# Patient Record
Sex: MALE | Race: ASIAN | NOT HISPANIC OR LATINO | URBAN - METROPOLITAN AREA
[De-identification: names, ages, dates, MRNs, and addresses within clinical notes are randomized per-mention and may not be internally consistent; named-entity substitution may affect disease eponyms.]

---

## 2017-09-18 ENCOUNTER — EMERGENCY (EMERGENCY)
Facility: HOSPITAL | Age: 67
LOS: 1 days | Discharge: HOME | End: 2017-09-18
Attending: EMERGENCY MEDICINE | Admitting: EMERGENCY MEDICINE
Payer: MEDICARE

## 2017-09-18 VITALS
RESPIRATION RATE: 20 BRPM | OXYGEN SATURATION: 99 % | SYSTOLIC BLOOD PRESSURE: 128 MMHG | DIASTOLIC BLOOD PRESSURE: 87 MMHG | TEMPERATURE: 98 F | HEART RATE: 80 BPM

## 2017-09-18 DIAGNOSIS — Z23 ENCOUNTER FOR IMMUNIZATION: ICD-10-CM

## 2017-09-18 DIAGNOSIS — Z88.6 ALLERGY STATUS TO ANALGESIC AGENT: ICD-10-CM

## 2017-09-18 DIAGNOSIS — S61.011A LACERATION WITHOUT FOREIGN BODY OF RIGHT THUMB WITHOUT DAMAGE TO NAIL, INITIAL ENCOUNTER: ICD-10-CM

## 2017-09-18 PROCEDURE — 99283 EMERGENCY DEPT VISIT LOW MDM: CPT | Mod: 25

## 2017-09-18 RX ORDER — TETANUS TOXOID, REDUCED DIPHTHERIA TOXOID AND ACELLULAR PERTUSSIS VACCINE, ADSORBED 5; 2.5; 8; 8; 2.5 [IU]/.5ML; [IU]/.5ML; UG/.5ML; UG/.5ML; UG/.5ML
0.5 SUSPENSION INTRAMUSCULAR ONCE
Qty: 0 | Refills: 0 | Status: COMPLETED | OUTPATIENT
Start: 2017-09-18 | End: 2017-09-18

## 2017-09-18 RX ADMIN — TETANUS TOXOID, REDUCED DIPHTHERIA TOXOID AND ACELLULAR PERTUSSIS VACCINE, ADSORBED 0.5 MILLILITER(S): 5; 2.5; 8; 8; 2.5 SUSPENSION INTRAMUSCULAR at 00:47

## 2017-09-18 NOTE — ED PROVIDER NOTE - SKIN, MLM
~ .7 cm lac- superficial to tip of R thumb- nail sliced ~ 3mm vertically (broke the cut), n/v intact

## 2017-09-18 NOTE — ED PROVIDER NOTE - OBJECTIVE STATEMENT
67 M here with laceration to R thumb tip from a kitchen knife. ? Td. No other complaints. No other injuries.

## 2017-09-18 NOTE — ED ADULT TRIAGE NOTE - CHIEF COMPLAINT QUOTE
walkin patient with complaints of laceration tor ight thumb by knife while washing dishes. No active bleeding noted. Last tdap unknown.

## 2017-09-18 NOTE — ED PROCEDURE NOTE - CPROC ED POST PROC CARE GUIDE1
Instructed patient/caregiver regarding signs and symptoms of infection./Verbal/written post procedure instructions were given to patient/caregiver./Keep the cast/splint/dressing clean and dry.

## 2018-10-27 ENCOUNTER — EMERGENCY (EMERGENCY)
Facility: HOSPITAL | Age: 68
LOS: 1 days | Discharge: HOME | End: 2018-10-27
Attending: EMERGENCY MEDICINE | Admitting: EMERGENCY MEDICINE
Payer: MEDICARE

## 2018-10-27 VITALS
SYSTOLIC BLOOD PRESSURE: 129 MMHG | TEMPERATURE: 97 F | DIASTOLIC BLOOD PRESSURE: 75 MMHG | RESPIRATION RATE: 20 BRPM | HEART RATE: 93 BPM | OXYGEN SATURATION: 96 %

## 2018-10-27 PROCEDURE — 73110 X-RAY EXAM OF WRIST: CPT | Mod: 26,RT

## 2018-10-27 PROCEDURE — 25630 CLTX CARPL FX W/O MNPJ EA B1: CPT | Mod: 54

## 2018-10-27 PROCEDURE — 99284 EMERGENCY DEPT VISIT MOD MDM: CPT | Mod: 25

## 2018-10-27 NOTE — ED ADULT NURSE NOTE - NSIMPLEMENTINTERV_GEN_ALL_ED
Implemented All Universal Safety Interventions:  [place] to call system. Call bell, personal items and telephone within reach. Instruct patient to call for assistance. Room bathroom lighting operational. Non-slip footwear when patient is off stretcher. Physically safe environment: no spills, clutter or unnecessary equipment. Stretcher in lowest position, wheels locked, appropriate side rails in place.

## 2018-10-27 NOTE — ED PROVIDER NOTE - NSFOLLOWUPINSTRUCTIONS_ED_ALL_ED_FT
Keep hand in splint.  Follow up with Dr. Boggs from hand surgery in 1 week or alternatively follow up with hand surgeon in [place].
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
## 2018-10-27 NOTE — ED PROVIDER NOTE - PRINCIPAL DIAGNOSIS
Wrist injury, right, initial encounter Closed nondisplaced fracture of triquetrum of right wrist, initial encounter

## 2018-10-27 NOTE — ED PROVIDER NOTE - CARE PLAN
Principal Discharge DX:	Wrist injury, right, initial encounter Principal Discharge DX:	Closed nondisplaced fracture of triquetrum of right wrist, initial encounter

## 2018-10-27 NOTE — ED PROVIDER NOTE - CONSTITUTIONAL, MLM
normal... Well appearing, well nourished, awake, alert, oriented to person, place, time/situation and in no apparent distress.  No head trauma.

## 2018-10-27 NOTE — ED PROVIDER NOTE - SKIN, MLM
Skin normal color for race, warm, dry and intact. No evidence of rash.  Skin in tact, no skin breaks.

## 2018-10-27 NOTE — ED PROVIDER NOTE - DIAGNOSTIC INTERPRETATION
3 views R wrist:  + triquetrum fracture, soft tissue swelling in area, no significant displacement and no dislocation.
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
## 2018-10-27 NOTE — ED PROVIDER NOTE - MUSCULOSKELETAL, MLM
Mild distal R ulnar ttp, FROM, no crepitus, nl cap refill and pulses, no deformity, no snuffbox ttp.  No ttp to L wrist.

## 2018-10-28 RX ORDER — OXYCODONE AND ACETAMINOPHEN 5; 325 MG/1; MG/1
1 TABLET ORAL ONCE
Qty: 0 | Refills: 0 | Status: DISCONTINUED | OUTPATIENT
Start: 2018-10-28 | End: 2018-10-28

## 2018-10-31 DIAGNOSIS — S62.114A NONDISPLACED FRACTURE OF TRIQUETRUM [CUNEIFORM] BONE, RIGHT WRIST, INITIAL ENCOUNTER FOR CLOSED FRACTURE: ICD-10-CM
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
## 2018-10-31 DIAGNOSIS — Z88.6 ALLERGY STATUS TO ANALGESIC AGENT: ICD-10-CM

## 2018-10-31 DIAGNOSIS — M25.531 PAIN IN RIGHT WRIST: ICD-10-CM
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
## 2020-07-24 ENCOUNTER — EMERGENCY (EMERGENCY)
Facility: HOSPITAL | Age: 70
LOS: 1 days | Discharge: HOME | End: 2020-07-24
Admitting: EMERGENCY MEDICINE
Payer: MEDICARE

## 2020-07-24 VITALS
TEMPERATURE: 99 F | WEIGHT: 134.92 LBS | HEART RATE: 94 BPM | HEIGHT: 70 IN | SYSTOLIC BLOOD PRESSURE: 115 MMHG | OXYGEN SATURATION: 97 % | DIASTOLIC BLOOD PRESSURE: 80 MMHG | RESPIRATION RATE: 16 BRPM

## 2020-07-24 DIAGNOSIS — H00.15 CHALAZION LEFT LOWER EYELID: ICD-10-CM

## 2020-07-24 DIAGNOSIS — H57.12 OCULAR PAIN, LEFT EYE: ICD-10-CM

## 2020-07-24 PROCEDURE — 99283 EMERGENCY DEPT VISIT LOW MDM: CPT

## 2020-07-24 RX ORDER — ERYTHROMYCIN BASE 5 MG/GRAM
1 OINTMENT (GRAM) OPHTHALMIC (EYE)
Qty: 1 | Refills: 0
Start: 2020-07-24 | End: 2020-08-02

## 2020-07-24 NOTE — ED ADULT NURSE NOTE - CHPI ED NUR SYMPTOMS NEG
no blurred vision/no foreign body/no pain/no photophobia/no purulent drainage/no drainage/no eye lid swelling/no itching/no double vision/no discharge

## 2020-07-24 NOTE — ED PROVIDER NOTE - OBJECTIVE STATEMENT
Pt. denies any PMH , c/o growth to lower lt eye lid X 3 days, no pain , no vision changes, no eye d/c, no fever/chills.

## 2020-07-24 NOTE — ED PROVIDER NOTE - EYES, MLM
lt lower lid + pustule, no ttp, no d/c, eye lases nl,  sclera clear, Clear bilaterally, pupils equal, round and reactive to light.

## 2020-07-24 NOTE — ED PROVIDER NOTE - NSFOLLOWUPINSTRUCTIONS_ED_ALL_ED_FT
Chalazion     A chalazion is a swelling or lump on the eyelid. It can affect the upper eyelid or the lower eyelid.  What are the causes?  This condition may be caused by:  Long-lasting (chronic) inflammation of the eyelid glands.A blocked oil gland in the eyelid.What are the signs or symptoms?  Symptoms of this condition include:  Swelling of the eyelid. The swelling may spread to areas around the eye.A hard lump on the eyelid.Blurry vision. The lump on the eyelid may make it hard to see out of the eye.How is this diagnosed?  This condition is diagnosed with an examination of the eye.  How is this treated?  This condition is treated by applying a warm compress to the eyelid. If the condition does not improve, it may be treated with:  Medicine that is injected into the chalazion by a health care provider.Surgery.Medicine that is applied to the eye.Follow these instructions at home:  Managing pain and swelling     Apply a warm, moist compress to the eyelid 4–6 times a day for 10–15 minutes at a time. This will help to open any blocked glands and to reduce redness and swelling.Apply over-the-counter and prescription medicines only as told by your health care provider.General instructions     Do not touch the chalazion.Do not try to remove the pus. Do not squeeze the chalazion or stick it with a pin or needle.Do not rub your eyes.Wash your hands often. Dry your hands with a clean towel.Keep your face, scalp, and eyebrows clean.Avoid wearing eye makeup.If the chalazion does not break open (rupture) on its own, return to your health care provider.Keep all follow-up appointments as told by your health care provider. This is important.Contact a health care provider if:  Your eyelid has not improved in 4 weeks.Your eyelid is getting worse.You have a fever.The chalazion does not rupture on its own after a month of home treatment.Get help right away if:  You have pain in your eye.Your vision changes.The chalazion becomes painful or red.The chalazion gets bigger.Summary  A chalazion is a swelling or lump on the upper or lower eyelid.It may be caused by chronic inflammation or a blocked oil gland.Apply a warm, moist compress to the eyelid 4–6 times a day for 10–15 minutes at a time.Keep your face, scalp, and eyebrows clean.This information is not intended to replace advice given to you by your health care provider. Make sure you discuss any questions you have with your health care provider.

## 2020-07-24 NOTE — ED PROVIDER NOTE - CLINICAL SUMMARY MEDICAL DECISION MAKING FREE TEXT BOX
Pt. denies any PMH , c/o growth to lower lt eye lid X 3 days, no pain , no vision changes, no eye d/c, no fever/chills vss, exam consistent with chalazion.
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
## 2020-07-24 NOTE — ED PROVIDER NOTE - CHPI ED SYMPTOMS NEG
no double vision/no discharge/no drainage/no foreign body/no blurred vision/no purulent drainage/no photophobia/no pain/no itching

## 2020-07-24 NOTE — ED PROVIDER NOTE - PATIENT PORTAL LINK FT
You can access the FollowMyHealth Patient Portal offered by [facility] by registering at the following website: [email or web address]. By joining [employer]’s FollowMyHealth portal, you will also be able to view your health information using other applications (apps) compatible with our system.

## 2020-08-03 ENCOUNTER — EMERGENCY (EMERGENCY)
Facility: HOSPITAL | Age: 70
LOS: 1 days | Discharge: HOME | End: 2020-08-03
Admitting: EMERGENCY MEDICINE
Payer: MEDICARE

## 2020-08-03 VITALS
WEIGHT: 139.99 LBS | HEART RATE: 86 BPM | HEIGHT: 70 IN | SYSTOLIC BLOOD PRESSURE: 132 MMHG | OXYGEN SATURATION: 96 % | TEMPERATURE: 99 F | DIASTOLIC BLOOD PRESSURE: 83 MMHG | RESPIRATION RATE: 18 BRPM

## 2020-08-03 VITALS
DIASTOLIC BLOOD PRESSURE: 86 MMHG | SYSTOLIC BLOOD PRESSURE: 134 MMHG | TEMPERATURE: 99 F | HEART RATE: 81 BPM | OXYGEN SATURATION: 97 % | RESPIRATION RATE: 16 BRPM

## 2020-08-03 DIAGNOSIS — R05 COUGH: ICD-10-CM

## 2020-08-03 DIAGNOSIS — Z88.8 ALLERGY STATUS TO OTHER DRUGS, MEDICAMENTS AND BIOLOGICAL SUBSTANCES STATUS: ICD-10-CM

## 2020-08-03 DIAGNOSIS — Z20.828 CONTACT WITH AND (SUSPECTED) EXPOSURE TO OTHER VIRAL COMMUNICABLE DISEASES: ICD-10-CM

## 2020-08-03 DIAGNOSIS — K21.9 GASTRO-ESOPHAGEAL REFLUX DISEASE WITHOUT ESOPHAGITIS: ICD-10-CM

## 2020-08-03 LAB
ALBUMIN SERPL ELPH-MCNC: 3.6 G/DL — SIGNIFICANT CHANGE UP (ref 3.4–5)
ALP SERPL-CCNC: 63 U/L — SIGNIFICANT CHANGE UP (ref 40–120)
ALT FLD-CCNC: 17 U/L — SIGNIFICANT CHANGE UP (ref 12–42)
ANION GAP SERPL CALC-SCNC: 7 MMOL/L — LOW (ref 9–16)
AST SERPL-CCNC: 17 U/L — SIGNIFICANT CHANGE UP (ref 15–37)
BASOPHILS # BLD AUTO: 0.05 K/UL — SIGNIFICANT CHANGE UP (ref 0–0.2)
BASOPHILS NFR BLD AUTO: 0.7 % — SIGNIFICANT CHANGE UP (ref 0–2)
BILIRUB SERPL-MCNC: 0.9 MG/DL — SIGNIFICANT CHANGE UP (ref 0.2–1.2)
BUN SERPL-MCNC: 11 MG/DL — SIGNIFICANT CHANGE UP (ref 7–23)
CALCIUM SERPL-MCNC: 8.6 MG/DL — SIGNIFICANT CHANGE UP (ref 8.5–10.5)
CHLORIDE SERPL-SCNC: 104 MMOL/L — SIGNIFICANT CHANGE UP (ref 96–108)
CO2 SERPL-SCNC: 30 MMOL/L — SIGNIFICANT CHANGE UP (ref 22–31)
CREAT SERPL-MCNC: 0.8 MG/DL — SIGNIFICANT CHANGE UP (ref 0.5–1.3)
EOSINOPHIL # BLD AUTO: 0.33 K/UL — SIGNIFICANT CHANGE UP (ref 0–0.5)
EOSINOPHIL NFR BLD AUTO: 4.3 % — SIGNIFICANT CHANGE UP (ref 0–6)
GLUCOSE SERPL-MCNC: 87 MG/DL — SIGNIFICANT CHANGE UP (ref 70–99)
HCT VFR BLD CALC: 36.9 % — LOW (ref 39–50)
HGB BLD-MCNC: 12.9 G/DL — LOW (ref 13–17)
IMM GRANULOCYTES NFR BLD AUTO: 0.3 % — SIGNIFICANT CHANGE UP (ref 0–1.5)
LYMPHOCYTES # BLD AUTO: 1.29 K/UL — SIGNIFICANT CHANGE UP (ref 1–3.3)
LYMPHOCYTES # BLD AUTO: 17 % — SIGNIFICANT CHANGE UP (ref 13–44)
MCHC RBC-ENTMCNC: 31.9 PG — SIGNIFICANT CHANGE UP (ref 27–34)
MCHC RBC-ENTMCNC: 35 GM/DL — SIGNIFICANT CHANGE UP (ref 32–36)
MCV RBC AUTO: 91.3 FL — SIGNIFICANT CHANGE UP (ref 80–100)
MONOCYTES # BLD AUTO: 0.54 K/UL — SIGNIFICANT CHANGE UP (ref 0–0.9)
MONOCYTES NFR BLD AUTO: 7.1 % — SIGNIFICANT CHANGE UP (ref 2–14)
NEUTROPHILS # BLD AUTO: 5.36 K/UL — SIGNIFICANT CHANGE UP (ref 1.8–7.4)
NEUTROPHILS NFR BLD AUTO: 70.6 % — SIGNIFICANT CHANGE UP (ref 43–77)
NRBC # BLD: 0 /100 WBCS — SIGNIFICANT CHANGE UP (ref 0–0)
NT-PROBNP SERPL-SCNC: 108 PG/ML — SIGNIFICANT CHANGE UP
PLATELET # BLD AUTO: 195 K/UL — SIGNIFICANT CHANGE UP (ref 150–400)
POTASSIUM SERPL-MCNC: 4.2 MMOL/L — SIGNIFICANT CHANGE UP (ref 3.5–5.3)
POTASSIUM SERPL-SCNC: 4.2 MMOL/L — SIGNIFICANT CHANGE UP (ref 3.5–5.3)
PROT SERPL-MCNC: 6.7 G/DL — SIGNIFICANT CHANGE UP (ref 6.4–8.2)
RBC # BLD: 4.04 M/UL — LOW (ref 4.2–5.8)
RBC # FLD: 12.2 % — SIGNIFICANT CHANGE UP (ref 10.3–14.5)
SODIUM SERPL-SCNC: 141 MMOL/L — SIGNIFICANT CHANGE UP (ref 132–145)
TROPONIN I SERPL-MCNC: <0.017 NG/ML — LOW (ref 0.02–0.06)
WBC # BLD: 7.59 K/UL — SIGNIFICANT CHANGE UP (ref 3.8–10.5)
WBC # FLD AUTO: 7.59 K/UL — SIGNIFICANT CHANGE UP (ref 3.8–10.5)

## 2020-08-03 PROCEDURE — 99284 EMERGENCY DEPT VISIT MOD MDM: CPT

## 2020-08-03 PROCEDURE — 93010 ELECTROCARDIOGRAM REPORT: CPT

## 2020-08-03 RX ORDER — FAMOTIDINE 10 MG/ML
1 INJECTION INTRAVENOUS
Qty: 14 | Refills: 0
Start: 2020-08-03 | End: 2020-08-16

## 2020-08-03 NOTE — ED PROVIDER NOTE - OBJECTIVE STATEMENT
68 y/o male with PMHx of GERD (usually treated with vinegar and mixed with water) presents to the ED with complaints of some throat soreness and itching x 2 days. Also notes that last night, Patient had a mild episode of GERD and felt slightly nauseous. He attempted to treat the GERD with vinegar but received only minimal relief. Cough is also associated with the sore throat and keeps him up at night. Cough is nonproductive. Denies fever, chills, vomiting, chest pain, SOB, abdominal pain, dizziness, numbness, weakness, tingling, and headache. Patient notes that his wife was recently hospitalized for SBO and tested negative for COVID. Patient has not been tested for COVID.

## 2020-08-03 NOTE — ED PROVIDER NOTE - NSFOLLOWUPINSTRUCTIONS_ED_ALL_ED_FT
Gastritis    Gastritis is soreness and swelling (inflammation) of the lining of the stomach. Gastritis can develop as a sudden onset (acute) or long-term (chronic) condition. If gastritis is not treated, it can lead to stomach bleeding and ulcers. Causes include viral and bacterial infections, excessive alcohol consumption, tobacco use, or certain medications. Symptoms include nausea, vomiting, or abdominal pain or burning especially after eating. Avoid foods or drinks that make your symptoms worse such as caffeine, chocolate, spicy foods, acidic foods, or alcohol.    SEEK IMMEDIATE MEDICAL CARE IF YOU HAVE ANY OF THE FOLLOWING SYMPTOMS: black or bloody stools, blood or coffee-ground-colored vomitus, worsening abdominal pain, fever, or inability to keep fluids down.     THE COVID 19 TEST   - results may take up to 1-2 days to become available   - if your result is positive, you will receive a phone call from one of our coronavirus specialists   - negative result may not be communicated on time due to the sheer volume of testing from the ER. If you haven't heard from us within 5 days, you can check [employer] [facility] or call [address] [place] [address] (please do not call the ER for results)    Please continue to self quarantine (home isolation) until your result is back and following instructions accordingly  - positive: complete home isolation for a total of 14 days since day of testing and no more fever with feeling back to baseline   - negative: you will be able to stop home isolation but still practice standard precautions, similar to how you would manage a regular flu/cold     Return to ER for any worsening symptoms, such as persistent fever >100.4F, shortness of breath, coughing up bloody sputum, chest pain, lethargy, and fainting    Please remember to wash your hands frequently, avoid touching your face, and cover your cough/sneeze

## 2020-08-03 NOTE — ED PROVIDER NOTE - CHPI ED SYMPTOMS NEG
no weakness/no chills/no vomiting/no fever/no numbness/no chest pain, no SOB, no tingling, no abdominal pain, no dizziness, no headache

## 2020-08-03 NOTE — ED ADULT TRIAGE NOTE - CHIEF COMPLAINT QUOTE
Pt presents to ED with c/o indigestion and sore throat since last night, he attributes this to his hx of GERD, he has tried a homemade vinegar for the symptoms, denies H2 blocker or PPI use, afebrile, no known COVID-19 exposure. Pt presents to ED with c/o indigestion and sore throat since last night, he attributes this to his hx of GERD, he has tried a homemade vinegar solution for the symptoms, denies H2 blocker or PPI use, afebrile, no known COVID-19 exposure.

## 2020-08-03 NOTE — ED PROVIDER NOTE - PATIENT PORTAL LINK FT
You can access the FollowMyHealth Patient Portal offered by [facility] by registering at the following website: [email or web address]. By joining [employer] FollowMyHealth portal, you will also be able to view your health information using other applications (apps) compatible with our system.

## 2020-08-03 NOTE — ED ADULT NURSE NOTE - CHIEF COMPLAINT QUOTE
Pt presents to ED with c/o indigestion and sore throat since last night, he attributes this to his hx of GERD, he has tried a homemade vinegar for the symptoms, denies H2 blocker or PPI use, afebrile, no known COVID-19 exposure.

## 2020-08-03 NOTE — ED PROVIDER NOTE - PR
Please ask patient if she is feeling hopeless or if she has thoughts about harming herself. If so- RTO tomorrow, or go to ER if actively suicidal.    Otherwise, I will send in an Rx for 1 month. She can start now, but I do want to see her in the next 1-2 weeks.   She can start w 1 whole tablet q am, but if she has nausea with it, she can take 1/2 tablet for a week, then increase to 1 whole tablet.  Take w food.  Common side effects- dry mouth (always resolves), GI sx's (almost always resolve).  RTO sooner prn.   172

## 2020-08-03 NOTE — ED PROVIDER NOTE - CLINICAL SUMMARY MEDICAL DECISION MAKING FREE TEXT BOX
70 y/o male with history of GERD presenting with throat soreness and itching x 2 days. Will do labs, obtain COVID swab, and EKG.

## 2020-08-03 NOTE — ED ADULT NURSE NOTE - CHPI ED NUR SYMPTOMS NEG
no hematuria/no dysuria/no diarrhea/no fever/no chills/no nausea/no abdominal distension/no blood in stool/no burning urination/no vomiting

## 2020-08-03 NOTE — ED ADULT NURSE NOTE - OBJECTIVE STATEMENT
Pt w/ PMH of GERD presents w/o 4/10 mid-epigastric pain worsening since last night.  Pt denies SOB, cough, fever/chills, dizziness, N/V/D.  Pt states he normally treats w/ a home remedy, but denies relief w/ current regimen.  Pt is pending eval by [facility].

## 2020-08-03 NOTE — ED PROVIDER NOTE - PROGRESS NOTE DETAILS
99 rechecked pt - discussed all results. will d/c with pepcid. pt will follow up with his GI doctor for further evaluation. understands and agrees with plan.

## 2020-08-04 LAB — SARS-COV-2 RNA SPEC QL NAA+PROBE: SIGNIFICANT CHANGE UP

## 2020-08-05 PROBLEM — K21.9 GASTRO-ESOPHAGEAL REFLUX DISEASE WITHOUT ESOPHAGITIS: Chronic | Status: ACTIVE | Noted: 2020-08-03

## 2020-08-05 PROBLEM — Z00.00 ENCOUNTER FOR PREVENTIVE HEALTH EXAMINATION: Status: ACTIVE | Noted: 2020-08-05

## 2020-08-07 ENCOUNTER — EMERGENCY (EMERGENCY)
Facility: HOSPITAL | Age: 70
LOS: 1 days | Discharge: HOME | End: 2020-08-07
Attending: EMERGENCY MEDICINE | Admitting: EMERGENCY MEDICINE
Payer: MEDICARE

## 2020-08-07 VITALS
TEMPERATURE: 99 F | HEART RATE: 96 BPM | RESPIRATION RATE: 18 BRPM | OXYGEN SATURATION: 97 % | DIASTOLIC BLOOD PRESSURE: 73 MMHG | SYSTOLIC BLOOD PRESSURE: 115 MMHG | HEIGHT: 70 IN | WEIGHT: 134.04 LBS

## 2020-08-07 VITALS
RESPIRATION RATE: 18 BRPM | TEMPERATURE: 98 F | DIASTOLIC BLOOD PRESSURE: 81 MMHG | SYSTOLIC BLOOD PRESSURE: 133 MMHG | HEART RATE: 87 BPM | OXYGEN SATURATION: 99 %

## 2020-08-07 LAB
ALBUMIN SERPL ELPH-MCNC: 4.4 G/DL — SIGNIFICANT CHANGE UP (ref 3.3–5)
ALP SERPL-CCNC: 74 U/L — SIGNIFICANT CHANGE UP (ref 40–120)
ALT FLD-CCNC: 11 U/L — SIGNIFICANT CHANGE UP (ref 10–45)
ANION GAP SERPL CALC-SCNC: 8 MMOL/L — SIGNIFICANT CHANGE UP (ref 5–17)
APTT BLD: 34.8 SEC — SIGNIFICANT CHANGE UP (ref 27.5–35.5)
AST SERPL-CCNC: 24 U/L — SIGNIFICANT CHANGE UP (ref 10–40)
BASOPHILS # BLD AUTO: 0.05 K/UL — SIGNIFICANT CHANGE UP (ref 0–0.2)
BASOPHILS NFR BLD AUTO: 0.9 % — SIGNIFICANT CHANGE UP (ref 0–2)
BILIRUB SERPL-MCNC: 1 MG/DL — SIGNIFICANT CHANGE UP (ref 0.2–1.2)
BUN SERPL-MCNC: 7 MG/DL — SIGNIFICANT CHANGE UP (ref 7–23)
CALCIUM SERPL-MCNC: 9.6 MG/DL — SIGNIFICANT CHANGE UP (ref 8.4–10.5)
CHLORIDE SERPL-SCNC: 101 MMOL/L — SIGNIFICANT CHANGE UP (ref 96–108)
CK MB CFR SERPL CALC: 1.8 NG/ML — SIGNIFICANT CHANGE UP (ref 0–6.7)
CK SERPL-CCNC: 187 U/L — SIGNIFICANT CHANGE UP (ref 30–200)
CO2 SERPL-SCNC: 32 MMOL/L — HIGH (ref 22–31)
CREAT SERPL-MCNC: 0.76 MG/DL — SIGNIFICANT CHANGE UP (ref 0.5–1.3)
D DIMER BLD IA.RAPID-MCNC: <150 NG/ML DDU — SIGNIFICANT CHANGE UP
EOSINOPHIL # BLD AUTO: 0.16 K/UL — SIGNIFICANT CHANGE UP (ref 0–0.5)
EOSINOPHIL NFR BLD AUTO: 2.9 % — SIGNIFICANT CHANGE UP (ref 0–6)
GLUCOSE SERPL-MCNC: 106 MG/DL — HIGH (ref 70–99)
HCT VFR BLD CALC: 44.1 % — SIGNIFICANT CHANGE UP (ref 39–50)
HGB BLD-MCNC: 14.6 G/DL — SIGNIFICANT CHANGE UP (ref 13–17)
IMM GRANULOCYTES NFR BLD AUTO: 0.2 % — SIGNIFICANT CHANGE UP (ref 0–1.5)
INR BLD: 0.96 — SIGNIFICANT CHANGE UP (ref 0.88–1.16)
LYMPHOCYTES # BLD AUTO: 1.31 K/UL — SIGNIFICANT CHANGE UP (ref 1–3.3)
LYMPHOCYTES # BLD AUTO: 23.5 % — SIGNIFICANT CHANGE UP (ref 13–44)
MCHC RBC-ENTMCNC: 31.4 PG — SIGNIFICANT CHANGE UP (ref 27–34)
MCHC RBC-ENTMCNC: 33.1 GM/DL — SIGNIFICANT CHANGE UP (ref 32–36)
MCV RBC AUTO: 94.8 FL — SIGNIFICANT CHANGE UP (ref 80–100)
MONOCYTES # BLD AUTO: 0.45 K/UL — SIGNIFICANT CHANGE UP (ref 0–0.9)
MONOCYTES NFR BLD AUTO: 8.1 % — SIGNIFICANT CHANGE UP (ref 2–14)
NEUTROPHILS # BLD AUTO: 3.59 K/UL — SIGNIFICANT CHANGE UP (ref 1.8–7.4)
NEUTROPHILS NFR BLD AUTO: 64.4 % — SIGNIFICANT CHANGE UP (ref 43–77)
NRBC # BLD: 0 /100 WBCS — SIGNIFICANT CHANGE UP (ref 0–0)
NT-PROBNP SERPL-SCNC: 107 PG/ML — SIGNIFICANT CHANGE UP (ref 0–300)
PLATELET # BLD AUTO: 220 K/UL — SIGNIFICANT CHANGE UP (ref 150–400)
POTASSIUM SERPL-MCNC: 4.6 MMOL/L — SIGNIFICANT CHANGE UP (ref 3.5–5.3)
POTASSIUM SERPL-SCNC: 4.6 MMOL/L — SIGNIFICANT CHANGE UP (ref 3.5–5.3)
PROT SERPL-MCNC: 7.7 G/DL — SIGNIFICANT CHANGE UP (ref 6–8.3)
PROTHROM AB SERPL-ACNC: 11.5 SEC — SIGNIFICANT CHANGE UP (ref 10.6–13.6)
RBC # BLD: 4.65 M/UL — SIGNIFICANT CHANGE UP (ref 4.2–5.8)
RBC # FLD: 11.9 % — SIGNIFICANT CHANGE UP (ref 10.3–14.5)
SODIUM SERPL-SCNC: 141 MMOL/L — SIGNIFICANT CHANGE UP (ref 135–145)
TROPONIN T SERPL-MCNC: <0.01 NG/ML — SIGNIFICANT CHANGE UP (ref 0–0.01)
WBC # BLD: 5.57 K/UL — SIGNIFICANT CHANGE UP (ref 3.8–10.5)
WBC # FLD AUTO: 5.57 K/UL — SIGNIFICANT CHANGE UP (ref 3.8–10.5)

## 2020-08-07 PROCEDURE — 85025 COMPLETE CBC W/AUTO DIFF WBC: CPT

## 2020-08-07 PROCEDURE — 82550 ASSAY OF CK (CPK): CPT

## 2020-08-07 PROCEDURE — 71046 X-RAY EXAM CHEST 2 VIEWS: CPT | Mod: 26

## 2020-08-07 PROCEDURE — 85610 PROTHROMBIN TIME: CPT

## 2020-08-07 PROCEDURE — 84484 ASSAY OF TROPONIN QUANT: CPT

## 2020-08-07 PROCEDURE — 85379 FIBRIN DEGRADATION QUANT: CPT

## 2020-08-07 PROCEDURE — 85730 THROMBOPLASTIN TIME PARTIAL: CPT

## 2020-08-07 PROCEDURE — 36415 COLL VENOUS BLD VENIPUNCTURE: CPT

## 2020-08-07 PROCEDURE — 83880 ASSAY OF NATRIURETIC PEPTIDE: CPT

## 2020-08-07 PROCEDURE — 93005 ELECTROCARDIOGRAM TRACING: CPT

## 2020-08-07 PROCEDURE — 82553 CREATINE MB FRACTION: CPT

## 2020-08-07 PROCEDURE — 99284 EMERGENCY DEPT VISIT MOD MDM: CPT | Mod: 25

## 2020-08-07 PROCEDURE — 71046 X-RAY EXAM CHEST 2 VIEWS: CPT

## 2020-08-07 PROCEDURE — 96374 THER/PROPH/DIAG INJ IV PUSH: CPT

## 2020-08-07 PROCEDURE — 99285 EMERGENCY DEPT VISIT HI MDM: CPT | Mod: 25

## 2020-08-07 PROCEDURE — 93010 ELECTROCARDIOGRAM REPORT: CPT

## 2020-08-07 PROCEDURE — 80053 COMPREHEN METABOLIC PANEL: CPT

## 2020-08-07 RX ORDER — PANTOPRAZOLE SODIUM 20 MG/1
40 TABLET, DELAYED RELEASE ORAL ONCE
Refills: 0 | Status: COMPLETED | OUTPATIENT
Start: 2020-08-07 | End: 2020-08-07

## 2020-08-07 RX ADMIN — PANTOPRAZOLE SODIUM 40 MILLIGRAM(S): 20 TABLET, DELAYED RELEASE ORAL at 18:27

## 2020-08-07 NOTE — ED ADULT NURSE NOTE - CHIEF COMPLAINT QUOTE
" I was seen at [facility] for cough and they told me I have acid reflux but it still bothers me and I was wondering if something can be done about it".

## 2020-08-07 NOTE — ED ADULT NURSE NOTE - OBJECTIVE STATEMENT
Patient AOX4 c/o ongoing cough x 4 days---reports "I was told I have GERD, and that I needed to follow up"---speaking in full, complete sentences. Denies SOB/dizziness.

## 2020-08-07 NOTE — ED PROVIDER NOTE - CPE EDP EYES NORM
Chief Complaint   Patient presents with    6 Month Follow-Up   originally  patient moved from [facility]  ? ? ? Hx of Mitral valve repair/ clip for MR 5 yrs back- transcathater- per pat ( not confirmed)  ? ? ? Hx of coronary stent 2 5 yrs back per pat ( Not confirmed from record)  Record from outside  Reviewed and the above claim of the pat could be verified     6 month F/u    Denied cp, sob, palpitations, dizziness or edema    Nonsmoker    FHX  Mother had valve procedure, had stent at 72    Patient Active Problem List   Diagnosis    Coronary artery disease involving native coronary artery of native heart without angina pectoris    Diastolic murmur    Family history of early CAD    LVH (left ventricular hypertrophy)    ASHD (arteriosclerotic heart disease)    Dyslipidemia    Former smoker    Heart failure with preserved ejection fraction ([address]    Hypertension, essential    Mitral regurgitation    Nicotine dependence, chewing tobacco, uncomplicated    Obesity (BMI 30-39. 9)    S/P coronary artery stent placement    S/P mitral valve repair    Dysfunction of both eustachian tubes    Right lateral epicondylitis       Past Surgical History:   Procedure Laterality Date    APPENDECTOMY      CARDIAC CATHETERIZATION      CORONARY ANGIOPLASTY      INGUINAL HERNIA REPAIR      INGUINAL HERNIA REPAIR Left 05/13/2016    MITRAL VALVE REPAIR      ROTATOR CUFF REPAIR Right     right and left    VENTRAL HERNIA REPAIR  05/13/2016       No Known Allergies     Family History   Problem Relation Age of Onset    No Known Problems Mother     No Known Problems Father     Colon Cancer Neg Hx     Prostate Cancer Neg Hx         Social History     Socioeconomic History    Marital status:      Spouse name: Not on file    Number of children: Not on file    Years of education: Not on file    Highest education level: Not on file   Occupational History    Not on file   Social Needs    Financial resource strain: Not on file    Food insecurity     Worry: Not on file     Inability: Not on file    Transportation needs     Medical: Not on file     Non-medical: Not on file   Tobacco Use    Smoking status: Former Smoker     Start date: 2015     Last attempt to quit: 2016     Years since quittin.0    Smokeless tobacco: Current User     Types: Chew   Substance and Sexual Activity    Alcohol use: Not Currently    Drug use: Never    Sexual activity: Not on file   Lifestyle    Physical activity     Days per week: Not on file     Minutes per session: Not on file    Stress: Not on file   Relationships    Social connections     Talks on phone: Not on file     Gets together: Not on file     Attends Sikhism service: Not on file     Active member of club or organization: Not on file     Attends meetings of clubs or organizations: Not on file     Relationship status: Not on file    Intimate partner violence     Fear of current or ex partner: Not on file     Emotionally abused: Not on file     Physically abused: Not on file     Forced sexual activity: Not on file   Other Topics Concern    Not on file   Social History Narrative    Not on file       Current Outpatient Medications   Medication Sig Dispense Refill    lisinopril (PRINIVIL;ZESTRIL) 20 MG tablet Take 1 tablet by mouth daily 90 tablet 3    nebivolol (BYSTOLIC) 2.5 MG tablet Take 1 tablet by mouth daily 90 tablet 3    atorvastatin (LIPITOR) 20 MG tablet Take 1 tablet by mouth daily 90 tablet 3    amLODIPine (NORVASC) 10 MG tablet Take 1 tablet by mouth daily      varenicline (CHANTIX CONTINUING MONTH ) 1 MG tablet Take 1 tablet by mouth 2 times daily 60 tablet 1    varenicline (CHANTIX STARTING MONTH MANISHA) 0.5 MG X 11 & 1 MG X 42 tablet Take by mouth as directed on box 1 box 0    fluticasone (FLONASE) 50 MCG/ACT nasal spray 1 spray by Nasal route daily 2 Bottle 0    aspirin EC 81 MG EC tablet Take 1 tablet by mouth daily 90 tablet 1     No current facility-administered medications for this visit. Review of Systems -     General ROS: negative  Psychological ROS: negative  Hematological and Lymphatic ROS: No history of blood clots or bleeding disorder. Respiratory ROS: no cough,  or wheezing, the rest see HPI  Cardiovascular ROS: See HPI  Gastrointestinal ROS: negative  Genito-Urinary ROS: no dysuria, trouble voiding, or hematuria  Musculoskeletal ROS: negative  Neurological ROS: no TIA or stroke symptoms  Dermatological ROS: negative      Blood pressure 136/89, pulse 55, height 5' 9\" (1.753 m), weight 211 lb 9.6 oz (96 kg). Physical Examination:    General appearance - alert, well appearing, and in no distress  HEENT- Pink conjunctiva  , Non-icteri sclera,PERRLA  Mental status - alert, oriented to person, place, and time  Neck - supple, no significant adenopathy, no JVD, or carotid bruits  Chest - clear to auscultation, no wheezes, rales or rhonchi, symmetric air entry  Heart - normal rate, regular rhythm, normal S1, S2, no murmurs, rubs, clicks or gallops  Abdomen - soft, nontender, nondistended, no masses or organomegaly  DAVI- no CVA or flank tenderness, no suprapubic tenderness  Neurological - alert, oriented, normal speech, no focal findings or movement disorder noted  Musculoskeletal/limbs - no joint tenderness, deformity or swelling   - peripheral pulses normal, no pedal edema, no clubbing or cyanosis  Skin - normal coloration and turgor, no rashes, no suspicious skin lesions noted  Psych- appropriate mood and affect    Lab  No results for input(s): CKTOTAL, CKMB, CKMBINDEX, TROPONINI in the last 72 hours.   CBC:   Lab Results   Component Value Date    WBC 6.7 01/15/2020    RBC 5.07 01/15/2020    HGB 16.0 01/15/2020    HCT 47.8 01/15/2020    MCV 94.3 01/15/2020    MCH 31.6 01/15/2020    MCHC 33.5 01/15/2020     01/15/2020    MPV 9.3 01/15/2020     BMP:    Lab Results   Component Value Date     01/15/2020    K 4.6 01/15/2020 CL 99 01/15/2020    CO2 24 01/15/2020    BUN 21 01/15/2020    LABALBU 4.5 01/15/2020    CREATININE 0.9 01/15/2020    CALCIUM 9.3 01/15/2020    LABGLOM 88 01/15/2020    GLUCOSE 102 01/15/2020     Hepatic Function Panel:    Lab Results   Component Value Date    ALKPHOS 63 01/15/2020    ALT 27 01/15/2020    AST 26 01/15/2020    PROT 7.2 01/15/2020    BILITOT 0.7 01/15/2020    BILIDIR <0.2 01/15/2020    LABALBU 4.5 01/15/2020     Magnesium:    Lab Results   Component Value Date    MG 2.2 01/15/2020     Warfarin PT/INR:  No components found for: PTPATWAR, PTINRWAR  HgBA1c:  No results found for: LABA1C  FLP:    Lab Results   Component Value Date    TRIG 60 01/15/2020    HDL 57 01/15/2020    LDLCALC 79 01/15/2020     TSH:  No results found for: TSH  Reviewed the record from outside Hospital  Cardiac cath Dec 30, 2015 at [facility], [address] [place] [address] [address] < [place]  ]EF 65%  LAD : 30-50%  LCx/RCA: Normal  RCA could not be accessed radially- had to be accessed through femoral access    Electronically signed by Katerina Thompson MD at 12/30/2015 11:37 AM EST        Mid LAD lesion 50% stenosed.       Final Impression:        1.  Coronary artery disease as described above    2.  Mild to moderate stenosis of the mid left anterior descending artery.    3.  The left circumflex Artery and the right coronary artery appear     angiographically normal.  The right coronary artery has a posterior     takeoff.    4.  Left ventricular filling pressures are normal    5.  Left ventricular systolic function is normal           Plan:        1.  Adding calcium channel blockers for possible spasm.    2.  Aspirin and statin therapy is imperative.      Esmer Chapa MD, [facility]    Echo Dec 2015  CONCLUSIONS     Left ventricular wall thickness mildly increased. Left ventricular cavity size normal.  Mild global hypokinesis.  LVEF   45-50%.     Normal right ventricular size.   Mildly reduced right ventricular global systolic function.     Mild-to-moderate mitral regurgitation    ekg  8/29/19  Sinus  Bradycardia   WITHIN NORMAL LIMITS    Conclusions      Summary   Normal left ventricle size and systolic function. Ejection fraction was   estimated at 60 %. There were no regional left ventricular wall motion   abnormalities and wall thickness was within normal limits. The left atrium is Mildly dilated. Signature      ----------------------------------------------------------------   Electronically signed by Bernard Perez MD (Interpreting   physician) on 09/05/2019 at 05:39 PM      Assessment    ? ? ? Hx valve procedure mitral valve ( could not be verified from the record)     Diagnosis Orders   1. Coronary artery disease involving native coronary artery of native heart without angina pectoris     2. Hypertension, essential     3. Dyslipidemia           Plan   The current meds and labs reviewed    Continue the current treatment and with constant vigilance to changes in symptoms and also any potential side effects. Return for care or seek medical attention immediately if symptoms got worse and/or develop new symptoms. Hypertension, on medical treatment. Seems to be under good control. Patient is compliant with medical treatment. Hyperlipidemia: on statins, followed periodically. Patient need periodic lipid and liver profile. Coronary artery disease, seems to be stable. Denies angina or change in breathing pattern  Never had any intervention from the record from cardiac stand point  But moderate nonobstructive CAD noted  Cont asa and statins  Asa 81 mg po qd  Metoprolol stopped In [place] due to ED per pat  Now on Bystolic    Echo- WNL   [facility]   NO stent noted   No Mitral valve surgery Noted from records    Stable and doing well    D/w the pat the plan of care datila    patient is advised to exercise 30 min s a day three times a week and about weight loss ,balance diet and     More fruits and vegetables .     RTC in 6 months    Rosemary Shade Isaias normal...

## 2020-08-07 NOTE — ED PROVIDER NOTE - NSFOLLOWUPINSTRUCTIONS_ED_ALL_ED_FT
follow up with your GI appointment and with your pcp       Food Choices for Gastroesophageal Reflux Disease, Adult  When you have gastroesophageal reflux disease (GERD), the foods you eat and your eating habits are very important. Choosing the right foods can help ease your discomfort. Think about working with a nutrition specialist (dietitian) to help you make good choices.  What are tips for following this plan?     Meals     Choose healthy foods that are low in fat, such as fruits, vegetables, whole grains, low-fat dairy products, and lean meat, fish, and poultry.Eat small meals often instead of 3 large meals a day. Eat your meals slowly, and in a place where you are relaxed. Avoid bending over or lying down until 2–3 hours after eating.Avoid eating meals 2–3 hours before bed.Avoid drinking a lot of liquid with meals.Cook foods using methods other than frying. Bake, grill, or broil food instead.Avoid or limit:  Chocolate.Peppermint or spearmint.Alcohol.Pepper.Black and decaffeinated coffee.Black and decaffeinated tea.Bubbly (carbonated) soft drinks.Caffeinated energy drinks and soft drinks.Limit high-fat foods such as:  Fatty meat or fried foods.Whole milk, cream, butter, or ice cream.Nuts and nut butters.Pastries, donuts, and sweets made with butter or shortening.Avoid foods that cause symptoms. These foods may be different for everyone. Common foods that cause symptoms include:  Tomatoes.Oranges, bria, and limes.Peppers.Spicy food.Onions and garlic.Vinegar.Lifestyle     Maintain a healthy weight. Ask your doctor what weight is healthy for you. If you need to lose weight, work with your doctor to do so safely.Exercise for at least 30 minutes for 5 or more days each week, or as told by your doctor.Wear loose-fitting clothes.Do not smoke. If you need help quitting, ask your doctor.Sleep with the head of your bed higher than your feet. Use a wedge under the mattress or blocks under the bed frame to raise the head of the bed.Summary  When you have gastroesophageal reflux disease (GERD), food and lifestyle choices are very important in easing your symptoms.Eat small meals often instead of 3 large meals a day. Eat your meals slowly, and in a place where you are relaxed.Limit high-fat foods such as fatty meat or fried foods.Avoid bending over or lying down until 2–3 hours after eating.Avoid peppermint and spearmint, caffeine, alcohol, and chocolate.This information is not intended to replace advice given to you by your health care provider. Make sure you discuss any questions you have with your health care provider.

## 2020-08-07 NOTE — ED PROVIDER NOTE - OBJECTIVE STATEMENT
68 y/o M with chronic reflux reports recent increase in reflux sensation. States today pt has a burning sensation substernal nonexertional. No SOB. Chronic cough- non productive cough worse at night. States cough in the past when reflux has been acting up. Has GI appointment next week. No Hx of known cardiac disease but Fhx of CAD.

## 2020-08-07 NOTE — ED PROVIDER NOTE - PATIENT PORTAL LINK FT
You can access the FollowMyHealth Patient Portal offered by [facility] by registering at the following website: [email or web address]. By joining [facility]’s FollowMyHealth portal, you will also be able to view your health information using other applications (apps) compatible with our system.

## 2020-08-07 NOTE — ED ADULT TRIAGE NOTE - CHIEF COMPLAINT QUOTE
" I was seen at [facility] for cough and they told me I have acid reflux but it still bothers me and I was wondering if something can be done about it".

## 2020-08-07 NOTE — ED PROVIDER NOTE - PROGRESS NOTE DETAILS
offered PPI , pt wishes to wait till GI appt, pt's workup reassuring,  emergent return instructions were discussed with patient

## 2020-08-07 NOTE — ED PROVIDER NOTE - CLINICAL SUMMARY MEDICAL DECISION MAKING FREE TEXT BOX
70 y/o M with acute cough and chest burning. Suspicion for gerd. Consider ACS underlying pulmonary malignancy but less likely since pt is a nonsmoker. 2nd post nasal drip from seasonal allergies. Will obtain labs, ekg, labs If reassuring,, plan to add seasonal allergy treatment as well as gerd treatment with PPI and advice GI f/u. Advice if cough does not resolve with treatment of allergy and gerd, likely do CT for possible pulmonary pathology but unlikely.

## 2020-08-11 DIAGNOSIS — K21.9 GASTRO-ESOPHAGEAL REFLUX DISEASE WITHOUT ESOPHAGITIS: ICD-10-CM

## 2020-08-11 DIAGNOSIS — R05 COUGH: ICD-10-CM

## 2020-08-12 ENCOUNTER — APPOINTMENT (OUTPATIENT)
Dept: GASTROENTEROLOGY | Facility: CLINIC | Age: 70
End: 2020-08-12

## 2020-08-27 ENCOUNTER — LABORATORY RESULT (OUTPATIENT)
Age: 70
End: 2020-08-27

## 2020-08-31 ENCOUNTER — APPOINTMENT (OUTPATIENT)
Dept: PULMONOLOGY | Facility: CLINIC | Age: 70
End: 2020-08-31
Payer: MEDICARE

## 2020-08-31 VITALS
DIASTOLIC BLOOD PRESSURE: 65 MMHG | TEMPERATURE: 98.2 F | OXYGEN SATURATION: 97 % | BODY MASS INDEX: 19.26 KG/M2 | HEART RATE: 90 BPM | HEIGHT: 69 IN | WEIGHT: 130 LBS | SYSTOLIC BLOOD PRESSURE: 120 MMHG

## 2020-08-31 DIAGNOSIS — Z87.09 PERSONAL HISTORY OF OTHER DISEASES OF THE RESPIRATORY SYSTEM: ICD-10-CM

## 2020-08-31 PROCEDURE — 99203 OFFICE O/P NEW LOW 30 MIN: CPT | Mod: 25

## 2020-08-31 PROCEDURE — 94010 BREATHING CAPACITY TEST: CPT

## 2020-08-31 NOTE — DISCUSSION/SUMMARY
[FreeTextEntry1] : this is still acute cough that is now slowly getting better\par \par as expected the gastroenterologist says reflux\par the ent says sinus inflamation\par \par the pulm says bronchitis\par \par and the patient gets better unrelated to any of the above.\par \par will culture sputum but give this a bit more time

## 2020-08-31 NOTE — PHYSICAL EXAM
[No Acute Distress] : no acute distress [Normal Oropharynx] : normal oropharynx [Normal Appearance] : normal appearance [No Neck Mass] : no neck mass [Normal Rate/Rhythm] : normal rate/rhythm [Normal S1, S2] : normal s1, s2 [No Murmurs] : no murmurs [No Abnormalities] : no abnormalities [No Focal Deficits] : no focal deficits [Oriented x3] : oriented x3 [Normal Affect] : normal affect [TextBox_68] : rhonchi insp more than exp

## 2020-09-03 RX ORDER — PREDNISONE 10 MG/1
10 TABLET ORAL
Qty: 30 | Refills: 11 | Status: ACTIVE | COMMUNITY
Start: 2020-09-03 | End: 1900-01-01

## 2020-09-30 LAB — BACTERIA SPT CULT: NORMAL

## 2020-12-17 NOTE — ED PROVIDER NOTE - CROS ED NEURO POS
Palliative care consulted, appreciate assistance  --GOC discussions initiated. Intubation re- discussed today along with palliative. She is aware of the situation and as per now will like to continue treatment and measure to prolong life.      no numbness

## 2020-12-23 PROBLEM — Z87.09 HISTORY OF ACUTE BRONCHITIS: Status: RESOLVED | Noted: 2020-08-31 | Resolved: 2020-12-23

## 2021-05-12 NOTE — ED ADULT NURSE NOTE - HIV OFFER
Conjuntivae and eyelids appear normal, Sclerae : White without injection Previously Declined (within the last year)

## 2023-08-03 NOTE — ED PROVIDER NOTE - NS_ATTENDINGSCRIBE_ED_ALL_ED
Detail Level: Simple
Size Of Lesion: 1mm
I personally performed the service described in the documentation recorded by the scribe in my presence, and it accurately and completely records my words and actions.

## 2023-09-21 NOTE — ED PROVIDER NOTE - PSYCHIATRIC, MLM
ear pain Alert and oriented to person, place, time/situation. normal mood and affect. no apparent risk to self or others.

## 2024-06-20 ENCOUNTER — EMERGENCY (EMERGENCY)
Facility: HOSPITAL | Age: 74
LOS: 1 days | Discharge: HOME | End: 2024-06-20
Admitting: EMERGENCY MEDICINE
Payer: MEDICARE

## 2024-06-20 VITALS
OXYGEN SATURATION: 97 % | HEIGHT: 69 IN | DIASTOLIC BLOOD PRESSURE: 71 MMHG | RESPIRATION RATE: 19 BRPM | HEART RATE: 92 BPM | TEMPERATURE: 98 F | WEIGHT: 141.98 LBS | SYSTOLIC BLOOD PRESSURE: 105 MMHG

## 2024-06-20 PROCEDURE — 99285 EMERGENCY DEPT VISIT HI MDM: CPT

## 2024-06-20 NOTE — ED PROVIDER NOTE - NSFOLLOWUPINSTRUCTIONS_ED_ALL_ED_FT
You have a breast mass that is concerning for cancer, please follow up with your doctor for workup and further management    Return for any concerning or worsening symptoms.

## 2024-06-20 NOTE — ED PROVIDER NOTE - OBJECTIVE STATEMENT
73-year-old male denies significant past medical history presents complaining of right nipple pain for the past 6 months.  Patient states he noticed swelling to the right nipple area over the past week.  Denies skin changes or nipple discharge.  Denies history of cancer, breast cancer or family history of breast cancer.  Patient denies being evaluated for this previously.

## 2024-06-20 NOTE — ED PROVIDER NOTE - PATIENT PORTAL LINK FT
You can access the FollowMyHealth Patient Portal offered by [facility] by registering at the following website: [email or web address]. By joining [facility]’s FollowMyHealth portal, you will also be able to view your health information using other applications (apps) compatible with our system.

## 2024-06-20 NOTE — ED ADULT NURSE NOTE - OBJECTIVE STATEMENT
Pt is a 73y male c/o nipple pain. Pt received by myself at 2000. Pt left p/t RN exam. Told KAREN Markham he does not wish to receive treatment and will f/u with PCP. Left w/out d/c papers.

## 2024-06-20 NOTE — ED PROVIDER NOTE - BIRTH SEX
Caller would like to discuss an/a Return call for short term disability. Writer advised caller of callback within 24-72 hours.    Patient Name: Jeanette Bermudze  Caller Name: Jeanette  Name of Facility:   Callback Number: [phone]  Best Availability: any  Can A Detailed Message Be left? no  Fax Number:   Additional Info: Patient wants to discuss being put on short term disability. Okay to call back when provider returns. Please advise.  Did you confirm the message with the caller?: yes    Thank you,  Adriana Farias        
Left detailed message for patient with information in regards to short term disability.  Patient is to call with any questions or concerns.   
Male

## 2024-06-20 NOTE — ED PROVIDER NOTE - CLINICAL SUMMARY MEDICAL DECISION MAKING FREE TEXT BOX
right breast mass concerning for malignancy, we discussed this with patient, offered CT here although not ideal imaging, discussed need for US/probable biopsy, patient states he will follow up with pmd and get workup with pmd as he does not want workup here.

## 2024-06-20 NOTE — ED PROVIDER NOTE - PHYSICAL EXAMINATION
CONSTITUTIONAL: Well-appearing; well-nourished; in no apparent distress.   	HEAD: Normocephalic; atraumatic.    normal appearance to chest, no skin changes, no dimpling or nipple discharge. half dollar sized mass palpated to right nipple, non mobile.   no masses palpated to left breast.   	NEURO: A & O x 3; face symmetric; grossly unremarkable.   PSYCHOLOGICAL: The patient’s mood and manner are appropriate.

## 2024-06-22 DIAGNOSIS — N64.4 MASTODYNIA: ICD-10-CM

## 2024-06-22 DIAGNOSIS — N63.10 UNSPECIFIED LUMP IN THE RIGHT BREAST, UNSPECIFIED QUADRANT: ICD-10-CM

## 2025-05-07 NOTE — ED ADULT NURSE NOTE - NS_NURSE_DISC_TEACHING_YN_ED_ALL_ED
SW received referral from RN/MD oncology team asking this writer to reach out to pt and discuss concerns he recently expressed about the cost of his medication.    SW called and spoke with pt re: matter above.  Per pt, he has new health insurance coverage through [employer]. He is having difficulty getting approval for coverage of his diabetic supplies/medicine and unable to afford the copay ($150) for his Eliquis.  His prostate medication (Nubeqa) is covered fully.  SW spoke with pt about an Eliquis copay card program which writer is hopeful will be able to reduce the cost for this medication. SW unclear about status of diabetic supplies/medicine.  Plan will be to reach out to [facility] to determine whether any assistance may be available for this medication.    Pt is open to exploring all options to reduce his medication costs. Plan at this time is to explore all options and writer will call pt back with update. Pt in agreement with plan and very appreciative for the assistance.     Addendum:    PIERCE was able to secure an Eliquis copay card for pt.  Plan will be to have Eliquis filled at [facility] and use copay card to reduce cost of medication to no more than $10.    SW will notify pt of copay card enrollment and also explain that prior auth is still needed for pt's diabetic supplies. Pharmacy did send request for PA to be completed.     Yes

## 2025-06-10 NOTE — ED ADULT NURSE NOTE - NSSISCREENINGQ4_ED_A_ED
Anesthesia Evaluation     Patient summary reviewed   no history of anesthetic complications:   NPO Solid Status: > 8 hours             Airway   Mallampati: II  Dental      Pulmonary    (+) asthma,  Cardiovascular   Exercise tolerance: good (4-7 METS)    (+) hypertension, CAD, CABG (2012), cardiac stents (2019.  Pt has been off ASA for years per his cardiologist) , PVD, hyperlipidemia  (-) angina      Neuro/Psych- negative ROS  GI/Hepatic/Renal/Endo    (+) GERD, renal disease- CRI    Musculoskeletal     Abdominal    Substance History      OB/GYN          Other                      Anesthesia Plan    ASA 3     MAC     intravenous induction     Anesthetic plan, risks, benefits, and alternatives have been provided, discussed and informed consent has been obtained with: patient.    CODE STATUS:         
No